# Patient Record
Sex: FEMALE | Race: WHITE | NOT HISPANIC OR LATINO | Employment: UNEMPLOYED | URBAN - NONMETROPOLITAN AREA
[De-identification: names, ages, dates, MRNs, and addresses within clinical notes are randomized per-mention and may not be internally consistent; named-entity substitution may affect disease eponyms.]

---

## 2017-10-03 ENCOUNTER — OFFICE VISIT (OUTPATIENT)
Dept: NEUROSURGERY | Facility: CLINIC | Age: 82
End: 2017-10-03

## 2017-10-03 DIAGNOSIS — M51.36 DDD (DEGENERATIVE DISC DISEASE), LUMBAR: ICD-10-CM

## 2017-10-03 DIAGNOSIS — M48.062 SPINAL STENOSIS OF LUMBAR REGION WITH NEUROGENIC CLAUDICATION: Primary | ICD-10-CM

## 2017-10-03 PROCEDURE — 99203 OFFICE O/P NEW LOW 30 MIN: CPT | Performed by: NEUROLOGICAL SURGERY

## 2017-10-03 RX ORDER — CARVEDILOL 12.5 MG/1
12.5 TABLET ORAL 2 TIMES DAILY WITH MEALS
COMMUNITY
Start: 2017-09-05

## 2017-10-03 RX ORDER — TRAMADOL HYDROCHLORIDE 50 MG/1
50 TABLET ORAL 2 TIMES DAILY
COMMUNITY
Start: 2017-09-05

## 2017-10-03 RX ORDER — POTASSIUM CHLORIDE 20 MEQ/1
TABLET, EXTENDED RELEASE ORAL
COMMUNITY
Start: 2017-09-05

## 2017-10-03 RX ORDER — AMLODIPINE BESYLATE 10 MG/1
TABLET ORAL
COMMUNITY
Start: 2017-09-05

## 2017-10-03 RX ORDER — MIRABEGRON 50 MG/1
TABLET, FILM COATED, EXTENDED RELEASE ORAL
COMMUNITY
Start: 2017-09-26

## 2017-10-03 RX ORDER — ATORVASTATIN CALCIUM 80 MG/1
TABLET, FILM COATED ORAL
COMMUNITY
Start: 2017-09-05

## 2017-10-03 RX ORDER — FENOFIBRATE 48 MG/1
TABLET, COATED ORAL
COMMUNITY
Start: 2017-09-05

## 2017-10-03 RX ORDER — LISINOPRIL 40 MG/1
TABLET ORAL
COMMUNITY
Start: 2017-09-05

## 2017-10-03 RX ORDER — LEVOTHYROXINE SODIUM 0.15 MG/1
TABLET ORAL
COMMUNITY
Start: 2017-09-05

## 2017-10-03 RX ORDER — BUMETANIDE 1 MG/1
TABLET ORAL
COMMUNITY
Start: 2017-09-05

## 2017-10-03 RX ORDER — CLOPIDOGREL BISULFATE 75 MG/1
TABLET ORAL
COMMUNITY
Start: 2017-09-05

## 2017-10-03 RX ORDER — DONEPEZIL HYDROCHLORIDE 10 MG/1
10 TABLET, FILM COATED ORAL NIGHTLY
COMMUNITY
Start: 2017-09-05

## 2017-10-03 RX ORDER — MEMANTINE HYDROCHLORIDE 5 MG/1
TABLET ORAL 2 TIMES DAILY
COMMUNITY
Start: 2017-09-05

## 2017-10-03 RX ORDER — SOLIFENACIN SUCCINATE 5 MG/1
TABLET, FILM COATED ORAL
COMMUNITY
Start: 2017-09-26

## 2017-10-03 RX ORDER — SERTRALINE HYDROCHLORIDE 100 MG/1
100 TABLET, FILM COATED ORAL NIGHTLY
COMMUNITY
Start: 2017-09-05

## 2017-10-03 NOTE — PROGRESS NOTES
Subjective   Patient ID: Yissel Chua is a 82 y.o. female is being seen for consultation today at the request of Dmitri Lizarraga MD  Chief Complaint: Back pain    History of Present Illness: The patient is an 82-year-old woman who has a history of progressive back and hip and leg pain over the past 6-12 months.  Symptoms are becoming more and more limiting.  She has to walk with a forward bend at the waist and feels unsteady when she walks, gets dizzy when she walks, and has difficulty with trying to turn.  She has to use a cane for support.  She is better when she is at rest, particular with her feet elevated.  She is worse with any strenuous activity.  Pain radiates particularly to the right hip.  She has had physical therapy and is finished the treatment but isn't any better for the effort.    Review of Radiographic Studies:  Lumbar CT scan was done on 8/28/17 and shows a probable severe stenosis at L4-5.  Degenerative changes are scattered throughout the lumbar spine.    The following portions of the patient's history were reviewed, updated as appropriate and approved: allergies, current medications, past family history, past medical history, past social history, past surgical history, review of systems and problem list.  Review of Systems   Constitutional: Negative for activity change, appetite change, chills, diaphoresis, fatigue, fever and unexpected weight change.   HENT: Negative for congestion, dental problem, drooling, ear discharge, ear pain, facial swelling, hearing loss, mouth sores, nosebleeds, postnasal drip, rhinorrhea, sinus pressure, sneezing, sore throat, tinnitus, trouble swallowing and voice change.    Eyes: Negative for photophobia, pain, discharge, redness, itching and visual disturbance.   Respiratory: Negative for apnea, cough, choking, chest tightness, shortness of breath, wheezing and stridor.    Cardiovascular: Negative for chest pain, palpitations and leg swelling.   Gastrointestinal:  Negative for abdominal distention, abdominal pain, anal bleeding, blood in stool, constipation, diarrhea, nausea, rectal pain and vomiting.   Endocrine: Negative for cold intolerance, heat intolerance, polydipsia, polyphagia and polyuria.   Genitourinary: Negative for decreased urine volume, difficulty urinating, dysuria, enuresis, flank pain, frequency, genital sores, hematuria and urgency.   Musculoskeletal: Positive for arthralgias, back pain, gait problem, joint swelling, myalgias and neck pain. Negative for neck stiffness.   Skin: Negative for color change, pallor, rash and wound.   Allergic/Immunologic: Negative for environmental allergies, food allergies and immunocompromised state.   Neurological: Positive for dizziness, weakness, light-headedness and numbness. Negative for tremors, seizures, syncope, facial asymmetry, speech difficulty and headaches.   Hematological: Negative for adenopathy. Does not bruise/bleed easily.   Psychiatric/Behavioral: Negative for agitation, behavioral problems, confusion, decreased concentration, dysphoric mood, hallucinations, self-injury, sleep disturbance and suicidal ideas. The patient is not nervous/anxious and is not hyperactive.        Objective     NEUROLOGICAL EXAMINATION:      MENTAL STATUS:  Alert and oriented.  Speech intact.  Recent and remote memory intact.      CRANIAL NERVES:  Cranial nerve II:  Visual fields are full to confrontation.  Cranial nerves III, IV and VI:  PERRLADC.  Extraocular movements are intact.  Nystagmus is not present.  Cranial nerve V:  Facial sensation is intact to light touch.  Cranial nerve VII:  Muscles of facial expression reveal no asymmetry.  Cranial nerve VIII:  Hearing is intact to finger rub bilaterally.  Cranial nerves IX and X:  Palate elevates symmetrically.  Cranial nerve XI:  Shoulder shrug is intact.  Cranial nerve XII:  Tongue is midline without evidence of atrophy or fasciculation.    MUSCULOSKELETAL:  SLR negative.  Kai's test negative.  Walks with a forward tilt at the waist and requires help with a hand for balance.    MOTOR:  There is no focal weakness of the muscles in the lower extremities.  She has difficulty rising from a seated to a standing position because of proximal leg weakness.  This appears to be the result of inactivity rather than neurogenic weakness.    SENSATION: Touch sensation intact over the lower extremities.    REFLEXES:  DTR absent in both knees and ankles.    Assessment   Probable lumbar stenosis L4-5.       Plan   Lumbar myelogram.  She has a bladder stimulator implanted and cannot have an MRI scan.       Max Hyman MD

## 2017-10-05 PROBLEM — M51.36 DDD (DEGENERATIVE DISC DISEASE), LUMBAR: Status: ACTIVE | Noted: 2017-10-05

## 2017-10-20 ENCOUNTER — APPOINTMENT (OUTPATIENT)
Dept: CT IMAGING | Facility: HOSPITAL | Age: 82
End: 2017-10-20

## 2017-10-20 ENCOUNTER — HOSPITAL ENCOUNTER (OUTPATIENT)
Facility: HOSPITAL | Age: 82
Setting detail: HOSPITAL OUTPATIENT SURGERY
Discharge: HOME | End: 2017-10-20
Attending: RADIOLOGY | Admitting: RADIOLOGY

## 2017-10-20 VITALS
DIASTOLIC BLOOD PRESSURE: 101 MMHG | HEART RATE: 60 BPM | TEMPERATURE: 97.9 F | OXYGEN SATURATION: 96 % | SYSTOLIC BLOOD PRESSURE: 155 MMHG | WEIGHT: 160.94 LBS | RESPIRATION RATE: 18 BRPM | HEIGHT: 62 IN | BODY MASS INDEX: 29.62 KG/M2

## 2017-10-20 DIAGNOSIS — M51.36 DDD (DEGENERATIVE DISC DISEASE), LUMBAR: ICD-10-CM

## 2017-10-20 DIAGNOSIS — M48.062 SPINAL STENOSIS OF LUMBAR REGION WITH NEUROGENIC CLAUDICATION: ICD-10-CM

## 2017-10-20 PROCEDURE — 0 IOPAMIDOL 61 % SOLUTION: Performed by: RADIOLOGY

## 2017-10-20 PROCEDURE — 72131 CT LUMBAR SPINE W/O DYE: CPT

## 2017-10-20 PROCEDURE — 62304 MYELOGRAPHY LUMBAR INJECTION: CPT | Performed by: RADIOLOGY

## 2017-10-20 RX ORDER — DOXYCYCLINE HYCLATE 100 MG
100 TABLET ORAL 2 TIMES DAILY
COMMUNITY

## 2017-10-20 RX ORDER — DIPHENHYDRAMINE HCL 25 MG
25 CAPSULE ORAL NIGHTLY PRN
COMMUNITY

## 2017-10-20 RX ORDER — CYCLOBENZAPRINE HCL 5 MG
5 TABLET ORAL 2 TIMES DAILY PRN
COMMUNITY

## 2017-10-20 RX ORDER — LANOLIN ALCOHOL/MO/W.PET/CERES
1000 CREAM (GRAM) TOPICAL
COMMUNITY

## 2017-10-20 RX ORDER — LANOLIN ALCOHOL/MO/W.PET/CERES
50 CREAM (GRAM) TOPICAL DAILY
COMMUNITY

## 2017-10-20 RX ORDER — CETIRIZINE HYDROCHLORIDE 10 MG/1
10 TABLET ORAL NIGHTLY
COMMUNITY

## 2017-10-20 RX ORDER — DOCUSATE SODIUM 100 MG/1
100 CAPSULE, LIQUID FILLED ORAL 2 TIMES DAILY
COMMUNITY

## 2017-10-20 RX ORDER — LANOLIN ALCOHOL/MO/W.PET/CERES
400 CREAM (GRAM) TOPICAL DAILY
COMMUNITY

## 2017-10-20 NOTE — PLAN OF CARE
Problem: Patient Care Overview (Adult)  Goal: Plan of Care Review  Outcome: Ongoing (interventions implemented as appropriate)    10/20/17 1215   Coping/Psychosocial Response Interventions   Plan Of Care Reviewed With patient;family   Patient Care Overview   Progress improving   Outcome Evaluation   Outcome Summary/Follow up Plan PT AND FAMILY ABLE TO VERBALIZE UNDERSTANDING OF DC INSTRUCTIONS- NO QUESTIONS AT THIS TIME- SITE STABLE AND AMBULATING WITH NO ISSUES.       Goal: Discharge Needs Assessment  Outcome: Ongoing (interventions implemented as appropriate)    10/20/17 1215   Discharge Needs Assessment   Concerns To Be Addressed no discharge needs identified         Problem: Myelogram (Adult)  Goal: Signs and Symptoms of Listed Potential Problems Will be Absent or Manageable (Myelogram)  Outcome: Ongoing (interventions implemented as appropriate)    10/20/17 1215   Myelogram   Problems Assessed (Myelogram) all   Problems Present (Myelogram) none

## 2017-10-20 NOTE — PLAN OF CARE
Problem: Patient Care Overview (Adult)  Goal: Plan of Care Review  Outcome: Ongoing (interventions implemented as appropriate)    10/20/17 0821   Coping/Psychosocial Response Interventions   Plan Of Care Reviewed With patient;daughter   Patient Care Overview   Progress no change   Outcome Evaluation   Outcome Summary/Follow up Plan Here for myelogram       Goal: Adult Individualization and Mutuality  Outcome: Ongoing (interventions implemented as appropriate)    10/20/17 0821   Individualization   Patient Specific Preferences needs assistance with walking   Patient Specific Interventions family at bedside       Goal: Discharge Needs Assessment  Outcome: Ongoing (interventions implemented as appropriate)    10/20/17 0815   Discharge Needs Assessment   Concerns To Be Addressed no discharge needs identified;denies needs/concerns at this time   Self-Care   Equipment Currently Used at Home cane, quad;walker, rolling;shower chair   Living Environment   Transportation Available car;family or friend will provide         Problem: Myelogram (Adult)  Goal: Signs and Symptoms of Listed Potential Problems Will be Absent or Manageable (Myelogram)  Outcome: Ongoing (interventions implemented as appropriate)    10/20/17 0821   Myelogram   Problems Assessed (Myelogram) all   Problems Present (Myelogram) none

## 2017-10-24 ENCOUNTER — OFFICE VISIT (OUTPATIENT)
Dept: NEUROSURGERY | Facility: CLINIC | Age: 82
End: 2017-10-24

## 2017-10-24 VITALS — BODY MASS INDEX: 29.44 KG/M2 | TEMPERATURE: 98 F | HEIGHT: 62 IN | WEIGHT: 160 LBS

## 2017-10-24 DIAGNOSIS — M51.36 DDD (DEGENERATIVE DISC DISEASE), LUMBAR: ICD-10-CM

## 2017-10-24 DIAGNOSIS — M48.062 SPINAL STENOSIS OF LUMBAR REGION WITH NEUROGENIC CLAUDICATION: Primary | ICD-10-CM

## 2017-10-24 PROCEDURE — 99213 OFFICE O/P EST LOW 20 MIN: CPT | Performed by: NEUROLOGICAL SURGERY

## 2017-10-24 NOTE — PROGRESS NOTES
Subjective   Yissel Chua is a 82 y.o. female who presents for follow up of  low back pain.     The patient is an 82-year-old woman who has a history of progressive back and hip and leg pain over the past 6-12 months.  Symptoms are becoming more and more limiting.  She has to walk with a forward bend at the waist and feels unsteady when she walks, gets dizzy when she walks, and has difficulty with trying to turn.  She has to use a cane for support.  She is better when she is at rest, particular with her feet elevated.  She is worse with any strenuous activity.  Pain radiates particularly to the right hip.  She has had physical therapy and is finished the treatment but isn't any better for the effort.    Lumbar myelogram was performed and shows a moderate stenosis at L4-5, and a moderate degenerative scoliosis throughout the lumbar spine.    Pain is located primarily in the lumbar spine, and at times it is felt in the right hip or leg, but that is not a predominant symptom.  Her symptoms are not characteristic of neurogenic claudication specifically.    The following portions of the patient's history were reviewed, updated as appropriate and approved: allergies, current medications, past family history, past medical history, past social history, past surgical history, review of systems and problem list.     Review of Systems   Constitutional: Negative for activity change, appetite change, chills, diaphoresis, fatigue, fever and unexpected weight change.   HENT: Negative for congestion, dental problem, drooling, ear discharge, ear pain, facial swelling, hearing loss, mouth sores, nosebleeds, postnasal drip, rhinorrhea, sinus pressure, sneezing, sore throat, tinnitus, trouble swallowing and voice change.    Eyes: Negative for photophobia, pain, discharge, redness, itching and visual disturbance.   Respiratory: Negative for apnea, cough, choking, chest tightness, shortness of breath, wheezing and stridor.     Cardiovascular: Negative for chest pain, palpitations and leg swelling.   Gastrointestinal: Negative for abdominal distention, abdominal pain, anal bleeding, blood in stool, constipation, diarrhea, nausea, rectal pain and vomiting.   Endocrine: Negative for cold intolerance, heat intolerance, polydipsia, polyphagia and polyuria.   Genitourinary: Negative for decreased urine volume, difficulty urinating, dysuria, enuresis, flank pain, frequency, genital sores, hematuria and urgency.   Musculoskeletal: Positive for back pain and myalgias. Negative for arthralgias, gait problem, joint swelling, neck pain and neck stiffness.   Skin: Negative for color change, pallor, rash and wound.   Allergic/Immunologic: Negative for environmental allergies, food allergies and immunocompromised state.   Neurological: Negative for dizziness, tremors, seizures, syncope, facial asymmetry, speech difficulty, weakness, light-headedness, numbness and headaches.   Hematological: Negative for adenopathy. Does not bruise/bleed easily.   Psychiatric/Behavioral: Negative for agitation, behavioral problems, confusion, decreased concentration, dysphoric mood, hallucinations, self-injury, sleep disturbance and suicidal ideas. The patient is not nervous/anxious and is not hyperactive.    All other systems reviewed and are negative.      Objective    NEUROLOGICAL EXAMINATION:    There is no focal weakness of the lower extremities, but a generalized weakness probably related to inactivity and lack of exercise.  No focal sensory loss.  No unilateral reflex asymmetry.    Assessment   Degenerative lumbar scoliosis with chronic progressive back pain syndrome.  Moderate stenosis L4-5, with minimal evidence of radiculopathy or neurogenic claudication.       Plan   I would predict surgery would provide no significant benefit and I recommend conservative management.       Max Hyman MD

## (undated) DEVICE — TRY L/P SFTY A/20G

## (undated) DEVICE — NDL SPINE 22G 31/2IN BLK